# Patient Record
Sex: FEMALE | Race: WHITE | Employment: FULL TIME | URBAN - METROPOLITAN AREA
[De-identification: names, ages, dates, MRNs, and addresses within clinical notes are randomized per-mention and may not be internally consistent; named-entity substitution may affect disease eponyms.]

---

## 2021-08-03 ENCOUNTER — HOSPITAL ENCOUNTER (EMERGENCY)
Facility: CLINIC | Age: 56
Discharge: HOME | End: 2021-08-03
Attending: EMERGENCY MEDICINE | Admitting: EMERGENCY MEDICINE
Payer: COMMERCIAL

## 2021-08-03 ENCOUNTER — APPOINTMENT (OUTPATIENT)
Dept: GENERAL RADIOLOGY | Facility: CLINIC | Age: 56
End: 2021-08-03
Attending: EMERGENCY MEDICINE
Payer: COMMERCIAL

## 2021-08-03 VITALS — OXYGEN SATURATION: 100 % | HEART RATE: 75 BPM | DIASTOLIC BLOOD PRESSURE: 98 MMHG | SYSTOLIC BLOOD PRESSURE: 151 MMHG

## 2021-08-03 DIAGNOSIS — E87.6 HYPOKALEMIA: ICD-10-CM

## 2021-08-03 DIAGNOSIS — R00.2 PALPITATIONS: ICD-10-CM

## 2021-08-03 DIAGNOSIS — R42 LIGHTHEADEDNESS: ICD-10-CM

## 2021-08-03 LAB
ALBUMIN SERPL-MCNC: 4.2 G/DL (ref 3.4–5)
ALP SERPL-CCNC: 54 U/L (ref 40–150)
ALT SERPL W P-5'-P-CCNC: 22 U/L (ref 0–50)
ANION GAP SERPL CALCULATED.3IONS-SCNC: 6 MMOL/L (ref 3–14)
AST SERPL W P-5'-P-CCNC: 18 U/L (ref 0–45)
ATRIAL RATE - MUSE: 100 BPM
BILIRUB SERPL-MCNC: 0.5 MG/DL (ref 0.2–1.3)
BUN SERPL-MCNC: 10 MG/DL (ref 7–30)
CALCIUM SERPL-MCNC: 9.4 MG/DL (ref 8.5–10.1)
CHLORIDE BLD-SCNC: 104 MMOL/L (ref 94–109)
CO2 SERPL-SCNC: 28 MMOL/L (ref 20–32)
CREAT SERPL-MCNC: 0.74 MG/DL (ref 0.52–1.04)
D DIMER PPP FEU-MCNC: 0.28 UG/ML FEU (ref 0–0.5)
DIASTOLIC BLOOD PRESSURE - MUSE: NORMAL MMHG
ERYTHROCYTE [DISTWIDTH] IN BLOOD BY AUTOMATED COUNT: 12 % (ref 10–15)
GFR SERPL CREATININE-BSD FRML MDRD: >90 ML/MIN/1.73M2
GLUCOSE BLD-MCNC: 115 MG/DL (ref 70–99)
HCT VFR BLD AUTO: 43.1 % (ref 35–47)
HGB BLD-MCNC: 14.3 G/DL (ref 11.7–15.7)
INTERPRETATION ECG - MUSE: NORMAL
MCH RBC QN AUTO: 28.6 PG (ref 26.5–33)
MCHC RBC AUTO-ENTMCNC: 33.2 G/DL (ref 31.5–36.5)
MCV RBC AUTO: 86 FL (ref 78–100)
P AXIS - MUSE: 51 DEGREES
PLATELET # BLD AUTO: 318 10E3/UL (ref 150–450)
POTASSIUM BLD-SCNC: 3.3 MMOL/L (ref 3.4–5.3)
PR INTERVAL - MUSE: 146 MS
PROT SERPL-MCNC: 8 G/DL (ref 6.8–8.8)
QRS DURATION - MUSE: 70 MS
QT - MUSE: 342 MS
QTC - MUSE: 441 MS
R AXIS - MUSE: 73 DEGREES
RBC # BLD AUTO: 5 10E6/UL (ref 3.8–5.2)
SODIUM SERPL-SCNC: 138 MMOL/L (ref 133–144)
SYSTOLIC BLOOD PRESSURE - MUSE: NORMAL MMHG
T AXIS - MUSE: 92 DEGREES
TROPONIN I SERPL-MCNC: <0.015 UG/L (ref 0–0.04)
VENTRICULAR RATE- MUSE: 100 BPM
WBC # BLD AUTO: 6.6 10E3/UL (ref 4–11)

## 2021-08-03 PROCEDURE — 80053 COMPREHEN METABOLIC PANEL: CPT | Performed by: EMERGENCY MEDICINE

## 2021-08-03 PROCEDURE — 36415 COLL VENOUS BLD VENIPUNCTURE: CPT | Performed by: EMERGENCY MEDICINE

## 2021-08-03 PROCEDURE — 258N000003 HC RX IP 258 OP 636: Performed by: EMERGENCY MEDICINE

## 2021-08-03 PROCEDURE — 84484 ASSAY OF TROPONIN QUANT: CPT | Performed by: EMERGENCY MEDICINE

## 2021-08-03 PROCEDURE — 93005 ELECTROCARDIOGRAM TRACING: CPT

## 2021-08-03 PROCEDURE — 250N000013 HC RX MED GY IP 250 OP 250 PS 637: Performed by: EMERGENCY MEDICINE

## 2021-08-03 PROCEDURE — 71046 X-RAY EXAM CHEST 2 VIEWS: CPT

## 2021-08-03 PROCEDURE — 85379 FIBRIN DEGRADATION QUANT: CPT | Performed by: EMERGENCY MEDICINE

## 2021-08-03 PROCEDURE — 99285 EMERGENCY DEPT VISIT HI MDM: CPT | Mod: 25

## 2021-08-03 PROCEDURE — 85014 HEMATOCRIT: CPT | Performed by: EMERGENCY MEDICINE

## 2021-08-03 PROCEDURE — 85027 COMPLETE CBC AUTOMATED: CPT | Performed by: EMERGENCY MEDICINE

## 2021-08-03 PROCEDURE — 96360 HYDRATION IV INFUSION INIT: CPT

## 2021-08-03 RX ORDER — POTASSIUM CHLORIDE 1.5 G/1.58G
20 POWDER, FOR SOLUTION ORAL ONCE
Status: COMPLETED | OUTPATIENT
Start: 2021-08-03 | End: 2021-08-03

## 2021-08-03 RX ADMIN — SODIUM CHLORIDE 1000 ML: 9 INJECTION, SOLUTION INTRAVENOUS at 15:39

## 2021-08-03 RX ADMIN — POTASSIUM CHLORIDE 20 MEQ: 1.5 POWDER, FOR SOLUTION ORAL at 15:45

## 2021-08-03 ASSESSMENT — ENCOUNTER SYMPTOMS
LIGHT-HEADEDNESS: 1
DIZZINESS: 1
SHORTNESS OF BREATH: 0
BACK PAIN: 1
PALPITATIONS: 1

## 2021-08-03 NOTE — ED PROVIDER NOTES
History   Chief Complaint:  Palpitations and Back Pain       The history is provided by the patient.      Murtaza Hernandez is a 56 year old female who presents with palpitations and back pain. Patient reports that she had PVC's this morning and got dizzy and lightheaded. She felt that her heart was beating very fast and she notes that the feeling would not go away. Patient went to lay down and was still dizzy although she denies any syncope. She states that her symptoms started 5 days ago after she got her COVID vaccine. She also reports that she has chronic back pain which she manages with percocet, but her pain flared up about 4 days ago. Additionally, she denies any chest pain or shortness of breath.       Review of Systems   Respiratory: Negative for shortness of breath.    Cardiovascular: Positive for palpitations. Negative for chest pain.   Musculoskeletal: Positive for back pain.   Neurological: Positive for dizziness and light-headedness. Negative for syncope.   All other systems reviewed and are negative.    Allergies:  Sulfa Drugs    Medications:  Mirena IUD  Percocet     Past Medical History:    Mechanical low back pain    Past Surgical History:    The patient denies past surgical history.     Social History:  PCP: Jamari Willis MD  Presents to the ED alone    Physical Exam     Patient Vitals for the past 24 hrs:   BP Pulse SpO2   08/03/21 1542 -- -- 100 %   08/03/21 1541 (!) 151/98 75 --   08/03/21 1540 (!) 152/95 75 100 %       Physical Exam  Nursing note and vitals reviewed.  Constitutional:  Appears well-developed and well-nourished.   HENT:   Head:    Atraumatic.   Mouth/Throat:   Oropharynx is clear and moist. No oropharyngeal exudate.   Eyes:    Pupils are equal, round, and reactive to light.   Neck:    Normal range of motion. Neck supple.      No tracheal deviation present. No thyromegaly present.   Cardiovascular:  Normal rate, regular rhythm, no murmur   Pulmonary/Chest: Breath  sounds are clear and equal without wheezes or crackles.  Abdominal:   Soft. Bowel sounds are normal. Exhibits no distension and      no mass. There is no tenderness.      There is no rebound and no guarding.   Musculoskeletal:  Exhibits no edema.   Lymphadenopathy:  No cervical adenopathy.   Neurological:   Alert and oriented to person, place, and time.   Skin:    Skin is warm and dry. No rash noted. No pallor.       Emergency Department Course     ECG:  ECG taken at 1258, ECG read at 1510  Normal sinus rhythm  Possible Left atrial enlargement  Septal infarct, age undetermined  Abnormal ECG  Rate 100 bpm. [place] interval 146 ms. QRS duration 70 ms. QT/QTc 342/441 ms. P-R-T axes 51 73 92.    Imaging:  Chest XR, PA & LAT:   IMPRESSION: No acute cardiopulmonary disease. No pneumothorax identified.   Reading per radiology    Laboratory:  CBC: WBC 6.6, HGB 14.3,    CMP: Glucose 115 (H), Potassium 3.3 (L), o/w WNL (Creatinine: 0.74)      Troponin (Collected 1302): <0.015  D-dimer: 0.28    Emergency Department Course:  Reviewed:  I reviewed the patient's nursing notes, vitals, past medical records, Care Everywhere.     Assessments:  1524 I performed an assessment and examination of the patient as noted above.      1616 Findings and plan explained to the Patient. Patient discharged home with instructions regarding supportive care, medications, and reasons to return. The importance of close follow-up was reviewed.     Interventions:  1539 NS 1L IV Bolus  1545 KLOR-CON 20 mEq PO    Disposition:  The patient was discharged to home.       Impression & Plan     Medical Decision Making:  Murtaza Hernandez is a 56 year old female who I found to have palpitations with associated light headedness likely due to free PVC's. She was in a normal sinus rhythm upon arrival. There was no sign of acute myocardial infarction. Her troponin was negative. Results show no sign of pulmonary embolism, chest pain, or shortness of breath and she  has a negative D-dimer. Chest x-ray does not show any pneumothorax or pneumonia. She is not anemic and I felt that she could be safely discharged home. I discussed her mildly elevated glucose at 115 which was likely due to a stress response. She states her blood sugar is normally low. She was given supplemental potassium and I discussed eating a high potassium diet with her. She was told to follow up with her primary care doctor this week and given signs and symptoms to return for.       Covid-19  Murtaza Hernandez was evaluated during a global COVID-19 pandemic, which necessitated consideration that the patient might be at risk for infection with the SARS-CoV-2 virus that causes COVID-19.   Applicable protocols for evaluation were followed during the patient's care.     Diagnosis:    ICD-10-CM    1. Palpitations  R00.2    2. Lightheadedness  R42    3. Hypokalemia  E87.6        Scribe Disclosure:  I, Oscar Kasi, am serving as a scribe at 3:24 PM on 8/3/2021 to document services personally performed by Donna Lee MD based on my observations and the provider's statements to me.        Donna Lee MD  08/03/21 3175

## 2021-08-03 NOTE — ED TRIAGE NOTES
"\"This morning I threw some benign PVC's but then got dizzy\" Got J&J vaccine on Thursday. Back pain and muscular pain that is worse than normal.   "